# Patient Record
Sex: FEMALE | URBAN - METROPOLITAN AREA
[De-identification: names, ages, dates, MRNs, and addresses within clinical notes are randomized per-mention and may not be internally consistent; named-entity substitution may affect disease eponyms.]

---

## 2023-06-09 ENCOUNTER — OFFICE VISIT (OUTPATIENT)
Dept: URBAN - METROPOLITAN AREA CLINIC 23 | Facility: CLINIC | Age: 23
End: 2023-06-09
Payer: COMMERCIAL

## 2023-06-09 DIAGNOSIS — H43.393 OTHER VITREOUS OPACITIES, BILATERAL: Primary | ICD-10-CM

## 2023-06-09 PROCEDURE — 99203 OFFICE O/P NEW LOW 30 MIN: CPT | Performed by: OPTOMETRIST

## 2023-06-09 ASSESSMENT — KERATOMETRY
OS: 42.50
OD: 43.13

## 2023-06-09 ASSESSMENT — INTRAOCULAR PRESSURE
OD: 17
OS: 17

## 2023-06-09 NOTE — IMPRESSION/PLAN
Impression: Other vitreous opacities, bilateral: H43.393. Plan: Pt edu on all findings. All signs and risks of retinal detachment and tears were discussed in detail. Patient instructed to call the office immediately with any symptoms noted. RTC PRN DFE with [employer]. Pt will have yearly DFE with primary OD.